# Patient Record
(demographics unavailable — no encounter records)

---

## 2017-06-16 NOTE — REP
MR BRAIN WITHOUT AND WITH CONTRAST:

 

HISTORY:  Hyperprolactinemia.

 

CONTRAST:  ProHance 9 mL.

 

There are no areas of abnormal signal intensity in the brain parenchyma .  There

is no intraparenchymal hemorrhage, infarct, mass or midline shift.  There is no

abdominal enhancement.  The ventricular system is normal in appearance.  There is

no extracerebral collection.  The sella turcica is partially empty.  A 5 mm focus

of slight decreased signal intensity is present in the left side of the pituitary

gland.  This enhances to a lesser degree than normal pituitary gland.  The

pituitary gland is normal in size measuring 6.7 mm in height. The infundibulum

inserts into the pituitary gland to the left of midline. The cavernous sinuses,

optic chiasm and hypothalamus are normal in appearance.  The sinuses are clear.

 

IMPRESSION:

 

There is a 5 mm focus of slight decreased signal intensity in the left side of

the pituitary gland that is suspicious for a small microadenoma.

 

 

Signed by

Charbel Velasquez MD 06/16/2017 11:20 A

## 2019-09-30 NOTE — PFTRPT
Height: 68.00  Inches  Weight: 206.00  Lbs  BSA: 2.07

Diagnosis: CHEST PAIN



DATE OF PROCEDURE:  09/30/2019



ORDERED BY:  Bernardo Gu



Spirometry:  Pre and post bronchodilator study of excellent technical quality.  
Forced vital capacity normal.  FEV1 in proportion.  Obstructive index is, 
therefore, normal.



Flow Volume Loop:  Expiratory limb of the flow volume loop is normal.  No 
significant bronchodilator response identified.



Lung Volumes:  Total lung capacity normal.  Residual volume generally in 
proportion.



Diffusing Capacity:  Diffusing capacity normal.



Hemoglobin:  No hemoglobin available for correction.



Airway Mechanics:  Airway resistance and conductance are normal.



IMPRESSION:  Normal study.

MTDD